# Patient Record
(demographics unavailable — no encounter records)

---

## 2024-11-12 NOTE — HISTORY OF PRESENT ILLNESS
[FreeTextEntry1] : Concepción is an 11 year old female who presents today accompanied by her mother for follow up evaluation of her right knee. She is approaching 8 months status-post the above procedure. Today, she reports that she has been doing overall well since last office visit, however, she has been having some discomfort for the past 2 weeks. Additionally, she reports a fall last night onto the right knee. However, she does not report any new episode of patellar instability and she does not describe any type of swelling of the knee. Overall, the patient reports that she feels she has regained her range of motion and is bearing weight on the right side without difficulty. The patient has not obtained repeat MRI of the right knee recommended at last office visit. Here today for further orthopedic evaluation.

## 2024-11-12 NOTE — DATA REVIEWED
[de-identified] : X-ray AP, lateral and oblique views of the right knee indicate were obtained today and reveal no acute fracture or dislocation. Reveal what appears to be osseous integration with no evidence of a significant intra-articular effusion with trace effusion noted in the suprapatellar pouch.

## 2024-11-12 NOTE — REASON FOR VISIT
[Follow Up] : a follow up visit [Patient] : patient [Mother] : mother [FreeTextEntry1] : right knee arthroscopic removal of loose body, open reduction internal fixation of lateral femoral condyle osteochondral fracture. Date of surgical management was on 03/20/2024 approaching 8 months post-op.

## 2024-11-12 NOTE — PHYSICAL EXAM
[FreeTextEntry1] : Concepción is in no apparent distress. She is pleasant, cooperative and alert, appropriate for age.  The patient still ambulates with evidence of a mild limp favoring her right lower extremity. She is able to maintain a straight leg position to the knee with stance. The patient has reciprocal heel-to-toe gait pattern and reasonable coordination and balance with gait.  Focused examination of the supine exam reveals 4+/5 muscle strength with quad and iliopsoas. She can maintain a straight leg raise against resistance without difficulty. She has full range of motion of the right knee. No palpable effusion. The patient does not have any crepitus with knee range of motion.

## 2024-11-12 NOTE — ASSESSMENT
[FreeTextEntry1] : Concepción is a 10-year-old female who underwent open reduction internal fixation of her lateral femoral condyle osteochondral fracture subsequent to a patellar dislocation in March 2024.  Today's assessment was performed with the assistance of the patient's parent as an independent historian given the patient's age, who could not be considered a reliable history/due to the nonverbal nature given the patient's young age. Clinical findings and x-ray results were reviewed at length with the patient and parent. The condition, natural history, and prognosis were explained to the patient and family. Today, I reviewed the x-ray imaging with the mother which does not reveal any acute fracture or dislocation status-post fall last evening. Clinically, she does not have any effusion. She has not sustained any patellar re-dislocations and the patient has no mechanical symptoms with knee flexion. Recommendation at this time is to obtain an insurance authorization for an MRI scan of the right knee. Study was ordered at today's office visit and our office will call the patient's family with the authorization to proceed with the imaging. Once the results of the MRI are obtained, we will review via telephone and discuss further management/necessary restrictions at that time.   All questions and concerns were addressed today. Parent and patient verbalize understanding and agree with plan of care.  I, Concepcion Mcclendon PA-C, have acted as a scribe and documented the above information for Dr. Valdovinos. The above documentation completed by the scribe is an accurate record of both my words and actions.  JADE